# Patient Record
Sex: FEMALE | Race: BLACK OR AFRICAN AMERICAN | NOT HISPANIC OR LATINO | RURAL
[De-identification: names, ages, dates, MRNs, and addresses within clinical notes are randomized per-mention and may not be internally consistent; named-entity substitution may affect disease eponyms.]

---

## 2020-08-18 ENCOUNTER — HISTORICAL (OUTPATIENT)
Dept: ADMINISTRATIVE | Facility: HOSPITAL | Age: 30
End: 2020-08-18

## 2020-08-18 LAB — SARS-COV+SARS-COV-2 AG RESP QL IA.RAPID: NEGATIVE

## 2020-08-27 ENCOUNTER — HISTORICAL (OUTPATIENT)
Dept: ADMINISTRATIVE | Facility: HOSPITAL | Age: 30
End: 2020-08-27

## 2020-08-28 LAB — SARS-COV-2 RNA AMPLIFICATION, QUAL: NEGATIVE

## 2022-10-26 ENCOUNTER — HOSPITAL ENCOUNTER (EMERGENCY)
Facility: HOSPITAL | Age: 32
Discharge: HOME | End: 2022-10-26

## 2022-10-26 VITALS
OXYGEN SATURATION: 99 % | HEART RATE: 103 BPM | WEIGHT: 280 LBS | DIASTOLIC BLOOD PRESSURE: 91 MMHG | TEMPERATURE: 99 F | SYSTOLIC BLOOD PRESSURE: 137 MMHG | BODY MASS INDEX: 43.95 KG/M2 | RESPIRATION RATE: 20 BRPM | HEIGHT: 67 IN

## 2022-10-26 DIAGNOSIS — R05.9 COUGH: ICD-10-CM

## 2022-10-26 PROCEDURE — 99283 EMERGENCY DEPT VISIT LOW MDM: CPT | Mod: 25

## 2022-10-26 PROCEDURE — 99281 PR EMERGENCY DEPT VISIT,LEVEL I: ICD-10-PCS | Mod: ,,, | Performed by: NURSE PRACTITIONER

## 2022-10-26 PROCEDURE — 99281 EMR DPT VST MAYX REQ PHY/QHP: CPT | Mod: ,,, | Performed by: NURSE PRACTITIONER

## 2022-10-26 NOTE — Clinical Note
"Pura Weller ([place]) was seen and treated in our emergency department on 10/26/2022.  She may return to work on 10/28/2022.       If you have any questions or concerns, please don't hesitate to call.      CALLI Hernandez"

## 2022-10-26 NOTE — ED PROVIDER NOTES
Encounter Date: 10/26/2022       History     Chief Complaint   Patient presents with    Cough    Fever    Chills    Nasal Congestion    Sore Throat     32 year old female presents to the emergency department to be evaluated for cough, sore throat, fever and body aches that began 1 week ago.     The history is provided by the patient.   Cough  This is a new problem. Associated symptoms include sore throat. Pertinent negatives include no chest pain, no chills, no sweats, no weight loss, no ear congestion, no ear pain, no headaches, no rhinorrhea, no myalgias, no shortness of breath, no wheezing and no eye redness.   Fever  Primary symptoms of the febrile illness include fever and cough. Primary symptoms do not include headaches, wheezing, shortness of breath or myalgias.   Sore Throat   Associated symptoms include coughing. Pertinent negatives include no ear pain, headaches or shortness of breath.   Review of patient's allergies indicates:  No Known Allergies  No past medical history on file.  No past surgical history on file.  No family history on file.     Review of Systems   Constitutional:  Positive for fever. Negative for chills and weight loss.   HENT:  Positive for sore throat. Negative for ear pain and rhinorrhea.    Eyes:  Negative for redness.   Respiratory:  Positive for cough. Negative for shortness of breath and wheezing.    Cardiovascular:  Negative for chest pain.   Musculoskeletal:  Negative for myalgias.   Neurological:  Negative for headaches.   All other systems reviewed and are negative.    Physical Exam     Initial Vitals [10/26/22 1125]   BP Pulse Resp Temp SpO2   (!) 137/91 103 20 98.8 °F (37.1 °C) 99 %      MAP       --         Physical Exam    Vitals reviewed.  Constitutional: She appears well-developed and well-nourished.   Neck: Neck supple.   Cardiovascular:  Normal rate and regular rhythm.           Pulmonary/Chest: Breath sounds normal.   Abdominal: Abdomen is soft. Bowel sounds are  normal. She exhibits no distension and no mass. There is no abdominal tenderness. There is no rebound and no guarding.   Musculoskeletal:         General: Normal range of motion.      Cervical back: Neck supple.     Neurological: She is alert and oriented to person, place, and time. She has normal strength. GCS score is 15. GCS eye subscore is 4. GCS verbal subscore is 5. GCS motor subscore is 6.   Skin: Skin is warm and dry. Capillary refill takes less than 2 seconds.   Psychiatric: She has a normal mood and affect.       Medical Screening Exam   See Full Note    ED Course   Procedures  Labs Reviewed - No data to display       Imaging Results              X-Ray Chest PA And Lateral (Final result)  Result time 10/26/22 11:51:31      Final result by Getachew Duran MD (10/26/22 11:51:31)                   Impression:      No acute findings.      Electronically signed by: Getachew Duran  Date:    10/26/2022  Time:    11:51               Narrative:    EXAMINATION:  XR CHEST PA AND LATERAL    CLINICAL HISTORY:  Cough, unspecified    TECHNIQUE:  PA and lateral views of the chest were performed.    COMPARISON:  None    FINDINGS:  Heart size normal. Lungs clear. No pneumothorax or pleural effusion.                                       Medications - No data to display                    Clinical Impression:   Final diagnoses:  [R05.9] Cough        ED Disposition Condition    Discharge Stable          ED Prescriptions    None       Follow-up Information    None          CALLI Hernandez  10/26/22 1216       CALLI Hernandez  11/08/22 0806

## 2024-10-28 ENCOUNTER — CLINICAL SUPPORT (OUTPATIENT)
Dept: PRIMARY CARE CLINIC | Facility: CLINIC | Age: 34
End: 2024-10-28

## 2024-10-28 DIAGNOSIS — Z02.89 EXAMINATION, PHYSICAL, EMPLOYEE: Primary | ICD-10-CM

## 2024-10-28 PROCEDURE — 99499 UNLISTED E&M SERVICE: CPT | Mod: ,,, | Performed by: NURSE PRACTITIONER
